# Patient Record
(demographics unavailable — no encounter records)

---

## 2025-04-29 NOTE — HISTORY OF PRESENT ILLNESS
[FreeTextEntry1] : Lucita Austin presents with missed menses and small vaginal bleeding/ spotting  u preg pos but too early to confirm pregnancy, will need to f/u for that  LMP: 03/29/2025 01/03/2026 4w2d

## 2025-04-29 NOTE — PLAN
[FreeTextEntry1] : Lucita Austin presents with missed menses and small vaginal bleeding/ spotting  u preg pos but too early to confirm pregnancy, will need to f/u for that  LMP: 03/29/2025 01/03/2026 4w2d  *will do preg confirm and "official" sono next visit today unoffical sono: nothing confirmed but nothing abnl seen

## 2025-05-16 NOTE — PLAN
[FreeTextEntry1] : here for confirmation of pregnancy lmp 3/29/25 6 wk 6 d by [facility] 1/2/26  viable pregnancy  present  Discussed with patient options regarding genetic tests. Options for invasive tests like amniocentesis, CVS versus NIPT and NT scans discussed. Also discussed with patient option for genetic carrier screening tests (both standard 4 tests including Cystic Fibrosis and extended panels with multiple tests). *she already did genetic panel no bleeding currently but did have a little in very early pregnancy routine precautions  NT referral slip given d/w pt transfer to my partners for delivery but not sure where she will be Reading material given.

## 2025-05-16 NOTE — PROCEDURE
[Transvaginal OB Sonogram] : Transvaginal OB Sonogram [Intrauterine Pregnancy] : intrauterine pregnancy [Yolk Sac] : yolk sac present [Fetal Heart] : fetal heart present [CRL: ___ (mm)] : CRL - [unfilled]Umm [Date: ___] : Date: [unfilled] [Current GA by Sonogram: ___ (wks)] : Current GA by Sonogram: [unfilled]Uwks [___ day(s)] : [unfilled] days [Transvaginal OB Sonogram WNL] : Transvaginal OB Sonogram WNL [Amenorrhea] : Amenorrhea [Transvaginal Ultrasound] : transvaginal ultrasound [Anteverted] : anteverted [Not Visualized] : not visualized [FreeTextEntry1] : IUP confirmed normal FH seen yolk sac and ges sac normal c/w LMP dates (different by 2 days) [FreeTextEntry3] : IUP confirmed see ob note

## 2025-06-09 NOTE — PLAN
[FreeTextEntry1] : pregnancy f/u 10 weeks had a little bit of spotting at 8 weeks but resolved NIPT discussed and done has NT appt to see my partners next visit *told to start 2 baby ASA at 12 weeks because some elevated bp readings here off and on